# Patient Record
(demographics unavailable — no encounter records)

---

## 2025-02-14 NOTE — HISTORY OF PRESENT ILLNESS
[FreeTextEntry1] : 69 year old male with hypertension, dyslipidemia, PAD, history of abdominal aortic aneurysm s/p endovascular repair of left iliac artery aneurysm, former smoker, spinal stenosis.

## 2025-02-14 NOTE — REVIEW OF SYSTEMS
[Dyspnea on exertion] : dyspnea during exertion [Chest Discomfort] : chest discomfort [Lower Ext Edema] : lower extremity edema [Palpitations] : no palpitations [Syncope] : no syncope [Cough] : no cough [Joint Pain] : joint pain [Rash] : no rash [Dizziness] : no dizziness [Confusion] : no confusion was observed [Easy Bleeding] : no tendency for easy bleeding [Easy Bruising] : no tendency for easy bruising [FreeTextEntry2] : walks with a cane

## 2025-02-14 NOTE — DISCUSSION/SUMMARY
[Patient] : the patient [EKG obtained to assist in diagnosis and management of assessed problem(s)] : EKG obtained to assist in diagnosis and management of assessed problem(s)

## 2025-02-14 NOTE — REASON FOR VISIT
[FreeTextEntry1] : Juan Rankin presents for follow up visit.  He was last seen on 7/2023. Mr. Rankin reports that he ran out of amlodipine 10 days ago.   He reports an episode of chest pain yesterday morning, lasted 5 minutes and resolved spontaneously. No associated symptoms. Prior to yesterday's visit, he reports being in his usual state of health. He has decreased his smoking to 3 cigarettes daily. He has dyspnea when walking on inclines, denies palpitations, dizziness or syncope.

## 2025-02-14 NOTE — CARDIOLOGY SUMMARY
[de-identified] : 2/14/25 Sinus LAFB [de-identified] : 1/12/23 No symptoms or significant EKG changes during Lexiscan infusion. Isolated VPCs. Small infarct involving the apex, apical lateral wall and apical septum with minimal lisette-infarct ischemia. Normal wall motion and thickening. EF 62%. No prior available. [de-identified] : 7/7/23 TTE 1. Left ventricular size and systolic/diastolic function are normal with an ejection fraction of 69% by Argueta's method of disks. 2. Normal right ventricular cavity size and normal right ventricular systolic function. 3. Mildly dilated proximal ascending aorta, measuring 4.20 cm (indexed 1.78 cm/m2).

## 2025-02-14 NOTE — PHYSICAL EXAM
[No Acute Distress] : no acute distress [Normal S1, S2] : normal S1, S2 [Clear Lung Fields] : clear lung fields [Good Air Entry] : good air entry [No Respiratory Distress] : no respiratory distress  [No Rash] : no rash [Moves all extremities] : moves all extremities [No Focal Deficits] : no focal deficits [Normal Speech] : normal speech [Alert and Oriented] : alert and oriented [de-identified] : wearing compression stockings

## 2025-03-14 NOTE — REVIEW OF SYSTEMS
[Lower Ext Edema] : lower extremity edema [Joint Pain] : joint pain [Dyspnea on exertion] : not dyspnea during exertion [Chest Discomfort] : no chest discomfort [Palpitations] : no palpitations [Syncope] : no syncope [Cough] : no cough [Rash] : no rash [Dizziness] : no dizziness [Confusion] : no confusion was observed [Easy Bleeding] : no tendency for easy bleeding [Easy Bruising] : no tendency for easy bruising [FreeTextEntry2] : walks with a cane

## 2025-03-14 NOTE — PHYSICAL EXAM
[No Acute Distress] : no acute distress [Normal S1, S2] : normal S1, S2 [Clear Lung Fields] : clear lung fields [Good Air Entry] : good air entry [No Respiratory Distress] : no respiratory distress  [No Rash] : no rash [Moves all extremities] : moves all extremities [No Focal Deficits] : no focal deficits [Normal Speech] : normal speech [Alert and Oriented] : alert and oriented [de-identified] : walks with a cane [de-identified] : wearing compression stockings

## 2025-03-14 NOTE — REASON FOR VISIT
[Spouse] : spouse [FreeTextEntry1] : Juan Rankin presents for follow up visit.    Mr. Rankin reports feeling well. He denies chest pain, SOB, palpitations, dizziness or syncope. He is taking amlodipine 5mg daily.

## 2025-03-14 NOTE — CARDIOLOGY SUMMARY
[de-identified] : 2/14/25 Sinus LAFB [de-identified] : 1/12/23 No symptoms or significant EKG changes during Lexiscan infusion. Isolated VPCs. Small infarct involving the apex, apical lateral wall and apical septum with minimal lisette-infarct ischemia. Normal wall motion and thickening. EF 62%. No prior available. [de-identified] : 7/7/23 TTE 1. Left ventricular size and systolic/diastolic function are normal with an ejection fraction of 69% by Argueta's method of disks. 2. Normal right ventricular cavity size and normal right ventricular systolic function. 3. Mildly dilated proximal ascending aorta, measuring 4.20 cm (indexed 1.78 cm/m2).